# Patient Record
(demographics unavailable — no encounter records)

---

## 2025-02-18 NOTE — HISTORY OF PRESENT ILLNESS
[FreeTextEntry1] : flu like illness  physical   [de-identified] : 31 yo m presents with head cold, flu like  congestion, body aches, low grade fevers started yesterday in am, has been taking intermittent tylenol for body aches  covid testing at home yesterday and was neg.   also for labs/ physical

## 2025-02-18 NOTE — HEALTH RISK ASSESSMENT
[Yes] : Yes [Monthly or less (1 pt)] : Monthly or less (1 point) [1 or 2 (0 pts)] : 1 or 2 (0 points) [Never (0 pts)] : Never (0 points) [No] : In the past 12 months have you used drugs other than those required for medical reasons? No [0] : 2) Feeling down, depressed, or hopeless: Not at all (0) [PHQ-2 Negative - No further assessment needed] : PHQ-2 Negative - No further assessment needed [Audit-CScore] : 0 [de-identified] : exercises  [de-identified] : fruits, veggies  [[id number]] : 0 [Never] : Never

## 2025-02-18 NOTE — PLAN
[FreeTextEntry1] : follow up labs, labs drawn in office   flu like illness supportive care encourage  will test to r/o viral illnesses  likely head cold  will send meds  reviewed risks, benefits, side effects, alternatives, regimen

## 2025-02-24 NOTE — ASSESSMENT
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 chemical pregnancy 1.15.2025 Duration of Relationship: 2 years Years unprotected sexual intercourse: none Time [place]: 5 months Sexual dysfunction: none Artificial lubricants: noine  Patient previously seen by Dr Cisneros LH 2.1 FSH  2.4 Testosterone 394 (2 PM) free T 42 (>44) bioavailable T 93.6 alb 4.9 SHBG 41 prolactin 2.6  semen analysis  volume 8 [40.5] tc 324 motility 69% grade 2.5 morphology 2 % (anai) - RMA  We discussed the significance of these findings.  We discussed the definition of "infertility" we discussed the incidence of chemical pregnancy miscarriage.  In light of the semen parameters I would just do further evaluation of Angela at this point.  The patient was interested in nutraceuticals and their impact on spermatogenesis.  We discussed the utilization of Proxeed or Profertil supplements.  We discussed the fact that there was no clear benefit of these medications but no harm associated with them.  He is interested in pursuing this.  Additionally he was found to have a low free testosterone and a low normal total testosterone.  These labs were done in the afternoon.  These will be repeated in the morning.  If he does start taking amino acid supplementation as above we will repeat his semen analysis in 3 months.  We also talked about DNA fragmentation studies which I would not pursue at this time Plan repeat T free T  (in am) wife evaluation "[id number] gynecologist" appt with reproductive endocrinologist  discussed DNA fragmentation repeat semen analysis

## 2025-02-24 NOTE — PHYSICAL EXAM
[Urethral Meatus] : meatus normal [Penis Abnormality] : normal circumcised penis [Epididymis] : the epididymides were normal [Testes Tenderness] : no tenderness of the testes [Testes Mass (___cm)] : there were no testicular masses [Chaperone Present] : A chaperone was present in the examining room during all aspects of the physical examination [de-identified] : 5 x 3 cm bilaterally ; no varicocele; vas x 2 [FreeTextEntry2] : Christina Morejon MA

## 2025-02-24 NOTE — HISTORY OF PRESENT ILLNESS
[Currently Experiencing ___] :  [unfilled] [FreeTextEntry1] : NANCY MG  is 30 year  Job: IR resident Partner Name: Angela Tolbert Partner Age: 30 Prior marriage: none Prior Pregnancy ? chemical pregnancy 1.15.2025 Duration of Relationship: 2 years Years unprotected sexual intercourse: none Time [place]: 5 months Sexual dysfunction: none Artificial lubricants: noine Exposure history: